# Patient Record
Sex: FEMALE | Race: WHITE | NOT HISPANIC OR LATINO | Employment: UNEMPLOYED | URBAN - METROPOLITAN AREA
[De-identification: names, ages, dates, MRNs, and addresses within clinical notes are randomized per-mention and may not be internally consistent; named-entity substitution may affect disease eponyms.]

---

## 2017-04-09 ENCOUNTER — HOSPITAL ENCOUNTER (EMERGENCY)
Facility: HOSPITAL | Age: 22
Discharge: HOME | End: 2017-04-09
Attending: EMERGENCY MEDICINE | Admitting: EMERGENCY MEDICINE

## 2017-04-09 ENCOUNTER — APPOINTMENT (OUTPATIENT)
Dept: GENERAL RADIOLOGY | Facility: HOSPITAL | Age: 22
End: 2017-04-09

## 2017-04-09 VITALS
RESPIRATION RATE: 18 BRPM | SYSTOLIC BLOOD PRESSURE: 140 MMHG | WEIGHT: 130 LBS | BODY MASS INDEX: 21.66 KG/M2 | DIASTOLIC BLOOD PRESSURE: 84 MMHG | HEIGHT: 65 IN | TEMPERATURE: 98.1 F | HEART RATE: 92 BPM | OXYGEN SATURATION: 98 %
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
## 2017-04-09 DIAGNOSIS — S52.571A OTHER CLOSED INTRA-ARTICULAR FRACTURE OF DISTAL END OF RIGHT RADIUS, INITIAL ENCOUNTER: ICD-10-CM

## 2017-04-09 PROCEDURE — 96376 TX/PRO/DX INJ SAME DRUG ADON: CPT

## 2017-04-09 PROCEDURE — 99284 EMERGENCY DEPT VISIT MOD MDM: CPT

## 2017-04-09 PROCEDURE — 96375 TX/PRO/DX INJ NEW DRUG ADDON: CPT

## 2017-04-09 PROCEDURE — 25010000002 PROPOFOL 10 MG/ML EMULSION: Performed by: EMERGENCY MEDICINE

## 2017-04-09 PROCEDURE — 96374 THER/PROPH/DIAG INJ IV PUSH: CPT

## 2017-04-09 PROCEDURE — 25010000002 MORPHINE SULFATE (PF) 2 MG/ML SOLUTION: Performed by: EMERGENCY MEDICINE

## 2017-04-09 PROCEDURE — 73110 X-RAY EXAM OF WRIST: CPT

## 2017-04-09 PROCEDURE — 25010000002 ONDANSETRON PER 1 MG: Performed by: EMERGENCY MEDICINE

## 2017-04-09 RX ORDER — MORPHINE SULFATE 2 MG/ML
2 INJECTION, SOLUTION INTRAMUSCULAR; INTRAVENOUS ONCE
Status: COMPLETED | OUTPATIENT
Start: 2017-04-09 | End: 2017-04-09

## 2017-04-09 RX ORDER — PROPOFOL 10 MG/ML
40 VIAL (ML) INTRAVENOUS ONCE
Status: COMPLETED | OUTPATIENT
Start: 2017-04-09 | End: 2017-04-09

## 2017-04-09 RX ORDER — MONTELUKAST SODIUM 10 MG/1
10 TABLET ORAL NIGHTLY
COMMUNITY

## 2017-04-09 RX ORDER — SODIUM CHLORIDE 0.9 % (FLUSH) 0.9 %
10 SYRINGE (ML) INJECTION AS NEEDED
Status: DISCONTINUED | OUTPATIENT
Start: 2017-04-09 | End: 2017-04-09 | Stop reason: HOSPADM

## 2017-04-09 RX ORDER — PROPOFOL 10 MG/ML
VIAL (ML) INTRAVENOUS
Status: COMPLETED | OUTPATIENT
Start: 2017-04-09 | End: 2017-04-09

## 2017-04-09 RX ORDER — FLUTICASONE PROPIONATE 50 MCG
2 SPRAY, SUSPENSION (ML) NASAL DAILY
COMMUNITY

## 2017-04-09 RX ORDER — HYDROCODONE BITARTRATE AND ACETAMINOPHEN 7.5; 325 MG/1; MG/1
1 TABLET ORAL EVERY 6 HOURS PRN
Qty: 16 TABLET | Refills: 0 | Status: SHIPPED | OUTPATIENT
Start: 2017-04-09

## 2017-04-09 RX ORDER — ONDANSETRON 2 MG/ML
4 INJECTION INTRAMUSCULAR; INTRAVENOUS ONCE
Status: COMPLETED | OUTPATIENT
Start: 2017-04-09 | End: 2017-04-09

## 2017-04-09 RX ADMIN — PROPOFOL 40 MG: 10 INJECTION, EMULSION INTRAVENOUS at 18:41

## 2017-04-09 RX ADMIN — PROPOFOL 40 MG: 10 INJECTION, EMULSION INTRAVENOUS at 18:46

## 2017-04-09 RX ADMIN — PROPOFOL 40 MG: 10 INJECTION, EMULSION INTRAVENOUS at 18:42

## 2017-04-09 RX ADMIN — MORPHINE SULFATE 2 MG: 2 INJECTION, SOLUTION INTRAMUSCULAR; INTRAVENOUS at 16:45

## 2017-04-09 RX ADMIN — MORPHINE SULFATE 2 MG: 2 INJECTION, SOLUTION INTRAMUSCULAR; INTRAVENOUS at 16:51

## 2017-04-09 RX ADMIN — ONDANSETRON 4 MG: 2 INJECTION INTRAMUSCULAR; INTRAVENOUS at 16:44

## 2017-04-09 RX ADMIN — PROPOFOL 40 MG: 10 INJECTION, EMULSION INTRAVENOUS at 18:44

## 2017-04-09 RX ADMIN — MORPHINE SULFATE 2 MG: 2 INJECTION, SOLUTION INTRAMUSCULAR; INTRAVENOUS at 17:13

## 2017-04-09 NOTE — ED PROVIDER NOTES
Subjective   HPI Comments: 21 y.o. female presents to the ED w/ c/o right wrist pain after a fall just PTA. She said she was roller blading when she fell with her arms outstretched on concrete, causing the severe pain in her right wrist. Pain is worse with movement, but she is able to move her fingers. She states that she has broken her wrist in the past when she was younger but isn't able to remember which wrist. She denies nausea or any other symptoms at this time.  She denies injury to any other area.  She has been ambulatory after the event.    Patient is a 21 y.o. female presenting with fall.   History provided by:  Patient  Fall   Mechanism of injury: fall    Injury location:  Shoulder/arm  Shoulder/arm injury location:  R wrist  Incident location:  Outdoors  Time since incident: PTA.  Arrived directly from scene: yes    Fall:     Fall occurred:  Recreating/playing (Rollerblading)    Height of fall:  Body height.    Impact surface:  [place]    Point of impact:  Outstretched arms    Entrapped after fall: no    Protective equipment: none    Prior to arrival data:     Patient ambulatory at scene: yes      Blood loss:  None    Responsiveness at scene:  Alert    Loss of consciousness: no      Amnesic to event: no    Associated symptoms: no abdominal pain, no back pain, no chest pain, no difficulty breathing, no headaches, no loss of consciousness, no nausea, no neck pain and no vomiting        Review of Systems   Constitutional: Negative for fever.   Respiratory: Negative for shortness of breath.    Cardiovascular: Negative for chest pain.   Gastrointestinal: Negative for abdominal pain, diarrhea, nausea and vomiting.   Musculoskeletal: Negative for back pain and neck pain.        Right wrist pain.    Neurological: Negative for loss of consciousness and headaches.   All other systems reviewed and are negative.      History reviewed. No pertinent past medical history.    No Known Allergies    History reviewed. No  pertinent surgical history.    History reviewed. No pertinent family history.    Social History     Social History   • Marital status: Single     Spouse name: N/A   • Number of children: N/A   • Years of education: N/A     Social History Main Topics   • Smoking status: Never Smoker   • Smokeless tobacco: None   • Alcohol use Yes      Comment: SOCIALLY   • Drug use: No   • Sexual activity: Defer     Other Topics Concern   • None     Social History Narrative   • None         Objective   Physical Exam   Constitutional: She appears well-developed and well-nourished. No distress.   HENT:   Head: Normocephalic and atraumatic.   Eyes: Conjunctivae are normal. No scleral icterus.   Neck: Normal range of motion. Neck supple.   Cardiovascular: Normal rate, regular rhythm and normal heart sounds.    Pulmonary/Chest: Effort normal and breath sounds normal. No respiratory distress.   Abdominal: Soft. Bowel sounds are normal. There is no tenderness.   Musculoskeletal: Normal range of motion. She exhibits tenderness (Rigt wrist.) and deformity (Right wrist dinner fork deformity.). She exhibits no edema.   Neurological: She is alert.   Sensation intact distally.    Skin: Skin is warm and dry.   Psychiatric: She has a normal mood and affect. Her behavior is normal.   Nursing note and vitals reviewed.      Procedural Sedation  Date/Time: 4/9/2017 6:56 PM  Performed by: MARBIN CASTRO  Authorized by: MARBIN CASTRO     Consent:     Consent obtained:  Verbal    Consent given by:  Patient    Risks discussed:  Allergic reaction, dysrhythmia, inadequate sedation, nausea, vomiting, respiratory compromise necessitating ventilatory assistance and intubation, prolonged sedation necessitating reversal and prolonged hypoxia resulting in organ damage  Indications:     Sedation purpose:  Fracture reduction    Procedure necessitating sedation performed by:  Physician performing sedation    Intended level of sedation:  Moderate (conscious  sedation)  Pre-sedation assessment:     Time since last food or drink:  6 hours    ASA classification: class 1 - normal, healthy patient      Neck mobility: normal      Mallampati score:  I - soft palate, uvula, fauces, pillars visible    Pre-sedation assessments completed and reviewed: airway patency, cardiovascular function, nausea/vomiting and pain level    Immediate pre-procedure details:     Reassessment: Patient reassessed immediately prior to procedure      Reviewed: vital signs, relevant labs/tests and NPO status      Verified: oxygen available and reversal medications available    Procedure details (see MAR for exact dosages):     Sedation start time:  4/9/2017 6:35 PM    Preoxygenation:  Room air    Sedation:  Propofol (160mg total)    Analgesia:  Morphine    Intra-procedure monitoring:  Blood pressure monitoring    Intra-procedure events: none      Sedation end time:  4/9/2017 6:59 PM    Total sedation time (minutes):  2  Post-procedure details:     Post-sedation assessment completed:  4/9/2017 6:59 PM    Attendance: Constant attendance by certified staff until patient recovered      Recovery: Patient returned to pre-procedure baseline      Post-sedation assessments completed and reviewed: airway patency, hydration status, nausea/vomiting and pain level      Patient tolerance:  Tolerated well, no immediate complications                 ED Course  ED Course   Comment By Time   Reviewed xray and paged dr Martinez. Sterling Diaz MD 04/09 6636   I discussed with Dr. Martinez who reviewed her x-rays.  He will attempt reduction in the emergency department and I will provide sedation. Sterling Diaz MD 04/09 5846   I discussed the risks and benefits of procedural sedation and closed reduction with Zehra and her friends.  She last ate at noon. Sterling Diaz MD 04/09 4811   Repeat exam Nancy is awake alert and tells me her arm feels much better.  I discussed use of narcotic pain medication.  She has follow-up  instructions from Dr. Juan Diaz MD 04/09 1926                     MDM  Number of Diagnoses or Management Options  Fall from in-line roller-skates, initial encounter: new and requires workup  Other closed intra-articular fracture of distal end of right radius, initial encounter: new and requires workup     Amount and/or Complexity of Data Reviewed  Tests in the radiology section of CPT®: ordered and reviewed  Discuss the patient with other providers: yes    Patient Progress  Patient progress: improved      Final diagnoses:   Fall from in-line roller-skates, initial encounter   Other closed intra-articular fracture of distal end of right radius, initial encounter       Documentation assistance provided by joshua Hernandez.  Information recorded by the scribe was done at my direction and has been verified and validated by me.     Rita Hernandez  04/09/17 1645       Rita Hernandez  04/09/17 1653       Rita Hernandez  04/09/17 1655       Rita Hernandez  04/09/17 1924       Sterling Diaz MD  04/10/17 0106

## 2017-04-09 NOTE — DISCHARGE INSTRUCTIONS
Apply ice to your arm over the next couple of days and elevated as much as possible.  If her fingers are numb, cold, pale or blue, call Dr. Martinez's office or return here for evaluation.  No driving on the pain medication

## 2017-04-09 NOTE — BRIEF OP NOTE
Procedure Note    Nancy Correia  * No surgery found *    Pre-op Diagnosis:   Unstable displaced right distal radius fracture    Post-op Diagnosis:     Unstable displaced right distal radius fracture    Procedure/CPT® Codes:  Closed reduction right distal radius extra-articular fracture            Anesthesia: * No surgery found *    Staff:   * Surgery not found *    Estimated Blood Loss: * No surgery found *  Urine Voided: * No surgery found *    Specimens:                * Cannot find log *      Drains:           Findings: Acceptable alignment on clinical exam   X-rays show near anatomic reduction   Placed in well molded sugar tong splint    Complications: none    Dictation [id number]    Brock Martinez MD     Date: 4/9/2017  Time: 7:02 PM

## 2017-04-10 NOTE — OP NOTE
DATE OF PROCEDURE:  04/09/2017    PREOPERATIVE DIAGNOSIS: Right distal radius extra-articular fracture.     POSTOPERATIVE DIAGNOSIS: Right distal radius extra-articular fracture.     PROCEDURE PERFORMED: Closed reduction, right distal radius extra-articular fracture.      SURGEON: Brock Martinez MD    ANESTHESIA: Propofol for sedation by Dr. Diaz.    INDICATIONS: A 21-year-old roller-blading this afternoon with right wrist injury. She has abrasion over the volar aspect of the wrist but no full-thickness injury. She has high-grade angulation and corresponding pain. No true paresthesias in the fingers. Good active motion of the fingers.     INFORMED CONSENT: Risks, benefits, indications, and rationale for closed reduction discussed with the patient who readily agrees to proceed for pain relief. She signs her own consent after all questions were answered.     DESCRIPTION OF PROCEDURE: While in the emergency room, Dr. Diaz administered propofol with satisfactory level. A costume jewelry ring was removed from the right finger with no major skin irritation. She was distracted across the fracture site and then fracture reduced with minimal difficulty. Stable reduced position was confirmed with wrist showing full pronation and supination as well as palmar flexion. Reduction maneuver again confirmed, which patient tolerated well. She was then placed into a well-molded sugar tong plaster splint. Post reduction x-rays show anatomic alignment on the AP view and minimal dorsal displacement on the lateral view with minimal loss of volar tilt of the articular surface. This is overall acceptable position.     DISPOSITION: We will plan to see her back in 5-10 days with repeat x-rays and probably change to a standard cast or removal splint cast at that point.     DISCHARGE INSTRUCTIONS:   1.  Advised on unlikely indications for surgical intervention.  2.  Encouraged on active motion of the fingers and elevation to control  swelling.   3.  She may use a sling p.r.n.  4.  Advised on expected immobilization in a cast for 4-6 weeks, which might interfere with her academic schedule, but we will try to make appropriate arrangements with her professors.       MD EMILY Ramos/cesia  DD: 04/09/2017 19:10:48  DT: 04/09/2017 19:59:37  Voice Rec. ID #[id number]  Voice Original ID #[id number]  Doc ID #[id number]  Rev. #0  cc:

## 2018-01-16 ENCOUNTER — HOSPITAL ENCOUNTER (OUTPATIENT)
Dept: OTHER | Age: 23
Discharge: HOME | End: 2018-01-16

## 2018-01-16 DIAGNOSIS — R07.9 CHEST PAIN, UNSPECIFIED TYPE: ICD-10-CM
